# Patient Record
Sex: MALE | Race: WHITE | Employment: FULL TIME | URBAN - METROPOLITAN AREA
[De-identification: names, ages, dates, MRNs, and addresses within clinical notes are randomized per-mention and may not be internally consistent; named-entity substitution may affect disease eponyms.]

---

## 2018-01-19 ENCOUNTER — HOSPITAL ENCOUNTER (EMERGENCY)
Age: 18
Discharge: HOME | End: 2018-01-19
Attending: EMERGENCY MEDICINE
Payer: COMMERCIAL

## 2018-01-19 VITALS
HEIGHT: 69 IN | RESPIRATION RATE: 20 BRPM | SYSTOLIC BLOOD PRESSURE: 172 MMHG | HEART RATE: 112 BPM | BODY MASS INDEX: 28.14 KG/M2 | TEMPERATURE: 100.6 F | WEIGHT: 190 LBS | DIASTOLIC BLOOD PRESSURE: 71 MMHG | OXYGEN SATURATION: 96 %

## 2018-01-19 DIAGNOSIS — B34.9 ACUTE VIRAL SYNDROME: Primary | ICD-10-CM

## 2018-01-19 DIAGNOSIS — R03.0 TRANSIENT ELEVATED BLOOD PRESSURE: ICD-10-CM

## 2018-01-19 LAB
ANION GAP SERPL CALCULATED.3IONS-SCNC: 14 MEQ/L (ref 7–13)
BUN BLDV-MCNC: 13 MG/DL (ref 6–20)
CALCIUM SERPL-MCNC: 10.1 MG/DL (ref 8.6–10.2)
CHLORIDE BLD-SCNC: 99 MEQ/L (ref 98–107)
CO2: 26 MEQ/L (ref 22–29)
CREAT SERPL-MCNC: 1.1 MG/DL (ref 0.7–1.2)
GFR AFRICAN AMERICAN: >60
GFR NON-AFRICAN AMERICAN: >60
GLUCOSE BLD-MCNC: 118 MG/DL (ref 74–109)
HCT VFR BLD CALC: 46.4 % (ref 42–52)
HEMOGLOBIN: 16.1 G/DL (ref 14–18)
MCH RBC QN AUTO: 30.3 PG (ref 27–31.3)
MCHC RBC AUTO-ENTMCNC: 34.6 % (ref 33–37)
MCV RBC AUTO: 87.5 FL (ref 80–100)
PDW BLD-RTO: 13 % (ref 11.5–14.5)
PLATELET # BLD: 302 K/UL (ref 130–400)
POTASSIUM SERPL-SCNC: 3.7 MEQ/L (ref 3.5–5.1)
RAPID INFLUENZA  B AGN: NEGATIVE
RAPID INFLUENZA A AGN: NEGATIVE
RBC # BLD: 5.3 M/UL (ref 4.7–6.1)
SODIUM BLD-SCNC: 139 MEQ/L (ref 132–144)
WBC # BLD: 14.1 K/UL (ref 4.5–11)

## 2018-01-19 PROCEDURE — 6370000000 HC RX 637 (ALT 250 FOR IP): Performed by: EMERGENCY MEDICINE

## 2018-01-19 PROCEDURE — 80048 BASIC METABOLIC PNL TOTAL CA: CPT

## 2018-01-19 PROCEDURE — 85027 COMPLETE CBC AUTOMATED: CPT

## 2018-01-19 PROCEDURE — 86403 PARTICLE AGGLUT ANTBDY SCRN: CPT

## 2018-01-19 PROCEDURE — 2580000003 HC RX 258: Performed by: EMERGENCY MEDICINE

## 2018-01-19 PROCEDURE — 99283 EMERGENCY DEPT VISIT LOW MDM: CPT

## 2018-01-19 PROCEDURE — 96360 HYDRATION IV INFUSION INIT: CPT

## 2018-01-19 RX ORDER — IBUPROFEN 600 MG/1
600 TABLET ORAL ONCE
Status: COMPLETED | OUTPATIENT
Start: 2018-01-19 | End: 2018-01-19

## 2018-01-19 RX ORDER — 0.9 % SODIUM CHLORIDE 0.9 %
1000 INTRAVENOUS SOLUTION INTRAVENOUS ONCE
Status: COMPLETED | OUTPATIENT
Start: 2018-01-19 | End: 2018-01-19

## 2018-01-19 RX ORDER — ACETAMINOPHEN 500 MG
1000 TABLET ORAL ONCE
Status: COMPLETED | OUTPATIENT
Start: 2018-01-19 | End: 2018-01-19

## 2018-01-19 RX ADMIN — IBUPROFEN 600 MG: 600 TABLET, FILM COATED ORAL at 01:03

## 2018-01-19 RX ADMIN — SODIUM CHLORIDE 1000 ML: 9 INJECTION, SOLUTION INTRAVENOUS at 01:03

## 2018-01-19 RX ADMIN — ACETAMINOPHEN 1000 MG: 500 TABLET ORAL at 01:03

## 2018-01-19 ASSESSMENT — ENCOUNTER SYMPTOMS
NAUSEA: 0
VOMITING: 0
SHORTNESS OF BREATH: 0
SORE THROAT: 1
COUGH: 1

## 2018-01-19 ASSESSMENT — PAIN DESCRIPTION - DESCRIPTORS: DESCRIPTORS: ACHING

## 2018-01-19 ASSESSMENT — PAIN SCALES - GENERAL
PAINLEVEL_OUTOF10: 3
PAINLEVEL_OUTOF10: 3

## 2018-01-19 ASSESSMENT — PAIN DESCRIPTION - LOCATION: LOCATION: THROAT

## 2018-01-19 ASSESSMENT — PAIN DESCRIPTION - FREQUENCY: FREQUENCY: INTERMITTENT

## 2019-11-01 ENCOUNTER — HOSPITAL ENCOUNTER (EMERGENCY)
Age: 19
Discharge: HOME | End: 2019-11-01
Attending: EMERGENCY MEDICINE
Payer: MEDICAID

## 2019-11-01 ENCOUNTER — APPOINTMENT (OUTPATIENT)
Dept: GENERAL RADIOLOGY | Age: 19
End: 2019-11-01
Payer: MEDICAID

## 2019-11-01 VITALS
HEART RATE: 85 BPM | WEIGHT: 190 LBS | BODY MASS INDEX: 26.6 KG/M2 | OXYGEN SATURATION: 97 % | DIASTOLIC BLOOD PRESSURE: 57 MMHG | HEIGHT: 71 IN | SYSTOLIC BLOOD PRESSURE: 130 MMHG | RESPIRATION RATE: 18 BRPM | TEMPERATURE: 98.4 F

## 2019-11-01 DIAGNOSIS — I10 ESSENTIAL HYPERTENSION: ICD-10-CM

## 2019-11-01 DIAGNOSIS — F41.1 ANXIETY STATE: Primary | ICD-10-CM

## 2019-11-01 DIAGNOSIS — R11.0 NAUSEA: ICD-10-CM

## 2019-11-01 LAB
EKG ATRIAL RATE: 70 BPM
EKG P AXIS: 55 DEGREES
EKG P-R INTERVAL: 148 MS
EKG Q-T INTERVAL: 352 MS
EKG QRS DURATION: 86 MS
EKG QTC CALCULATION (BAZETT): 380 MS
EKG R AXIS: 59 DEGREES
EKG T AXIS: 59 DEGREES
EKG VENTRICULAR RATE: 70 BPM

## 2019-11-01 PROCEDURE — 6370000000 HC RX 637 (ALT 250 FOR IP): Performed by: EMERGENCY MEDICINE

## 2019-11-01 PROCEDURE — 99283 EMERGENCY DEPT VISIT LOW MDM: CPT

## 2019-11-01 PROCEDURE — 71046 X-RAY EXAM CHEST 2 VIEWS: CPT

## 2019-11-01 PROCEDURE — 93005 ELECTROCARDIOGRAM TRACING: CPT

## 2019-11-01 PROCEDURE — 93010 ELECTROCARDIOGRAM REPORT: CPT | Performed by: INTERNAL MEDICINE

## 2019-11-01 RX ORDER — ONDANSETRON 4 MG/1
4 TABLET, ORALLY DISINTEGRATING ORAL ONCE
Status: COMPLETED | OUTPATIENT
Start: 2019-11-01 | End: 2019-11-01

## 2019-11-01 RX ORDER — LISINOPRIL 10 MG/1
20 TABLET ORAL DAILY
COMMUNITY

## 2019-11-01 RX ADMIN — ONDANSETRON 4 MG: 4 TABLET, ORALLY DISINTEGRATING ORAL at 13:43

## 2019-11-01 ASSESSMENT — ENCOUNTER SYMPTOMS
ABDOMINAL PAIN: 0
CHOKING: 0
BACK PAIN: 0
TROUBLE SWALLOWING: 0
SORE THROAT: 0
EYE PAIN: 0
DIARRHEA: 0
EYE REDNESS: 0
VOICE CHANGE: 0
FACIAL SWELLING: 0
SHORTNESS OF BREATH: 0
STRIDOR: 0
CHEST TIGHTNESS: 0
BLOOD IN STOOL: 0
EYE DISCHARGE: 0
VOMITING: 0
CONSTIPATION: 0
SINUS PRESSURE: 0
NAUSEA: 1
WHEEZING: 0
COUGH: 0

## 2019-11-15 ENCOUNTER — APPOINTMENT (OUTPATIENT)
Dept: GENERAL RADIOLOGY | Age: 19
End: 2019-11-15
Payer: MEDICAID

## 2019-11-15 ENCOUNTER — HOSPITAL ENCOUNTER (EMERGENCY)
Age: 19
Discharge: HOME | End: 2019-11-15
Attending: EMERGENCY MEDICINE
Payer: MEDICAID

## 2019-11-15 VITALS
TEMPERATURE: 98.7 F | DIASTOLIC BLOOD PRESSURE: 86 MMHG | HEART RATE: 91 BPM | HEIGHT: 72 IN | BODY MASS INDEX: 26.14 KG/M2 | SYSTOLIC BLOOD PRESSURE: 151 MMHG | RESPIRATION RATE: 20 BRPM | OXYGEN SATURATION: 99 % | WEIGHT: 193 LBS

## 2019-11-15 DIAGNOSIS — I49.3 PVC'S (PREMATURE VENTRICULAR CONTRACTIONS): Primary | ICD-10-CM

## 2019-11-15 LAB
EKG ATRIAL RATE: 83 BPM
EKG P AXIS: 53 DEGREES
EKG P-R INTERVAL: 142 MS
EKG Q-T INTERVAL: 348 MS
EKG QRS DURATION: 90 MS
EKG QTC CALCULATION (BAZETT): 408 MS
EKG R AXIS: 54 DEGREES
EKG T AXIS: 65 DEGREES
EKG VENTRICULAR RATE: 83 BPM

## 2019-11-15 PROCEDURE — 93010 ELECTROCARDIOGRAM REPORT: CPT | Performed by: INTERNAL MEDICINE

## 2019-11-15 PROCEDURE — 99285 EMERGENCY DEPT VISIT HI MDM: CPT

## 2019-11-15 PROCEDURE — 71045 X-RAY EXAM CHEST 1 VIEW: CPT

## 2019-11-15 PROCEDURE — 93005 ELECTROCARDIOGRAM TRACING: CPT

## 2019-11-15 RX ORDER — PANTOPRAZOLE SODIUM 20 MG/1
20 TABLET, DELAYED RELEASE ORAL DAILY
COMMUNITY

## 2019-11-15 ASSESSMENT — PAIN DESCRIPTION - PAIN TYPE: TYPE: ACUTE PAIN

## 2019-11-15 ASSESSMENT — PAIN DESCRIPTION - LOCATION: LOCATION: CHEST

## 2019-11-15 ASSESSMENT — PAIN DESCRIPTION - DESCRIPTORS: DESCRIPTORS: ACHING

## 2019-11-15 ASSESSMENT — PAIN SCALES - GENERAL: PAINLEVEL_OUTOF10: 4

## 2019-11-15 ASSESSMENT — PAIN DESCRIPTION - ORIENTATION: ORIENTATION: MID

## 2019-11-15 ASSESSMENT — PAIN DESCRIPTION - FREQUENCY: FREQUENCY: INTERMITTENT

## 2019-11-15 ASSESSMENT — PAIN DESCRIPTION - ONSET: ONSET: ON-GOING

## 2020-01-01 ENCOUNTER — HOSPITAL ENCOUNTER (EMERGENCY)
Age: 20
Discharge: HOME | End: 2020-01-01
Attending: EMERGENCY MEDICINE
Payer: MEDICAID

## 2020-01-01 VITALS
SYSTOLIC BLOOD PRESSURE: 150 MMHG | TEMPERATURE: 98.8 F | HEART RATE: 100 BPM | OXYGEN SATURATION: 96 % | RESPIRATION RATE: 20 BRPM | HEIGHT: 71 IN | BODY MASS INDEX: 28 KG/M2 | DIASTOLIC BLOOD PRESSURE: 68 MMHG | WEIGHT: 200 LBS

## 2020-01-01 LAB
INFLUENZA A BY PCR: NEGATIVE
INFLUENZA B BY PCR: POSITIVE

## 2020-01-01 PROCEDURE — 99283 EMERGENCY DEPT VISIT LOW MDM: CPT

## 2020-01-01 PROCEDURE — 87502 INFLUENZA DNA AMP PROBE: CPT

## 2020-01-01 RX ORDER — OSELTAMIVIR PHOSPHATE 75 MG/1
75 CAPSULE ORAL 2 TIMES DAILY
Qty: 10 CAPSULE | Refills: 0 | Status: SHIPPED | OUTPATIENT
Start: 2020-01-01 | End: 2020-01-06

## 2020-01-01 RX ORDER — ONDANSETRON 4 MG/1
4 TABLET, ORALLY DISINTEGRATING ORAL EVERY 8 HOURS PRN
Qty: 10 TABLET | Refills: 0 | Status: SHIPPED | OUTPATIENT
Start: 2020-01-01

## 2020-01-01 ASSESSMENT — ENCOUNTER SYMPTOMS
ABDOMINAL PAIN: 0
WHEEZING: 0
TROUBLE SWALLOWING: 0
COUGH: 0
EYE PAIN: 0
EYE DISCHARGE: 0
SORE THROAT: 0
BLOOD IN STOOL: 0
VOMITING: 1
VOICE CHANGE: 0
STRIDOR: 0
SINUS PRESSURE: 0
CHEST TIGHTNESS: 0
CONSTIPATION: 0
NAUSEA: 1
CHOKING: 0
EYE REDNESS: 0
DIARRHEA: 1
RHINORRHEA: 1
FACIAL SWELLING: 0
SHORTNESS OF BREATH: 0
BACK PAIN: 0

## 2020-01-01 NOTE — ED NOTES
Nursing Adult Assessment    General Appearance  [x] Facial Expressions, extremities, & body posture are relaxed. [] Exceptions:    Cognitive  [x] Alert, make eye contact when prompted. [x] Oriented to person, place, & situation. [] Exceptions:    Respiratory  [x] Unlabored breathing   [x] Speaks in clear and complete sentences   [x] Chest expansion is symmetrical with breaths   [] Breath sounds are clear bilaterally. [x] Exceptions: cough, diminished    Cardiovascular  [x] Regular apical heart sounds   [x] Peripheral pulses are palpable   [x] Capillary refill < 3 seconds in all extremities. [] Exceptions:    Abdomen  [x] Non-tender   [x] Non-distended   [x] Bowel sounds are present. [] Exceptions:    Skin  [x] Color appropriate for ethnicity   [x] No rash or discoloration present at the area(s) of complaint   [x] Warm and dry to touch. [] Exceptions:    Extremities  [x] Non-tender   [x] Normal range of motion   [x] Normal sensation   [x] Normal Appearance, No Edema.   [x] Exceptions:      Long Rao RN  01/01/20 1215

## 2020-01-01 NOTE — ED NOTES
Rapid flu collected. Patient given oral fluids. Refused medication per Dr. Esther Bello report.      Kristy Torres RN  01/01/20 Madi Alvarado [address] [place] [address]  01/01/20 1912

## 2020-01-01 NOTE — ED PROVIDER NOTES
rash.   Neurological: Negative for tremors, seizures, syncope, weakness, numbness and headaches. Hematological: Negative for adenopathy. Does not bruise/bleed easily. Psychiatric/Behavioral: Negative for agitation, behavioral problems, hallucinations and sleep disturbance. The patient is not hyperactive. All other systems reviewed and are negative. Except as noted above the remainder of the review of systems was reviewed and negative. PAST MEDICAL HISTORY     Past Medical History:   Diagnosis Date    Asperger's syndrome 2004    Hypertension          SURGICALHISTORY       Past Surgical History:   Procedure Laterality Date    BLADDER SURGERY           CURRENT MEDICATIONS       Previous Medications    LISINOPRIL (PRINIVIL;ZESTRIL) 10 MG TABLET    Take 20 mg by mouth daily     PANTOPRAZOLE (PROTONIX) 20 MG TABLET    Take 20 mg by mouth daily       ALLERGIES     Patient has no known allergies. FAMILY HISTORY     History reviewed. No pertinent family history.        SOCIAL HISTORY       Social History     Socioeconomic History    Marital status: Single     Spouse name: None    Number of children: None    Years of education: None    Highest education level: None   Occupational History    None   Social Needs    Financial resource strain: None    Food insecurity:     Worry: None     Inability: None    Transportation needs:     Medical: None     Non-medical: None   Tobacco Use    Smoking status: Never Smoker    Smokeless tobacco: Never Used   Substance and Sexual Activity    Alcohol use: No    Drug use: No    Sexual activity: Never   Lifestyle    Physical activity:     Days per week: None     Minutes per session: None    Stress: None   Relationships    Social connections:     Talks on phone: None     Gets together: None     Attends Mu-ism service: None     Active member of club or organization: None     Attends meetings of clubs or organizations: None     Relationship status: None    REFERRED TO:  Elnora Mcardle, MD  [address] [facility] [address]  [phone]    In 1 week        DISCHARGE MEDICATIONS:  New Prescriptions    ONDANSETRON (ZOFRAN ODT) 4 MG DISINTEGRATING TABLET    Take 1 tablet by mouth every 8 hours as needed for Nausea    OSELTAMIVIR (TAMIFLU) 75 MG CAPSULE    Take 1 capsule by mouth 2 times daily for 5 days          (Please note that portions of this note were completed with a voice recognition program.  Efforts were made to edit the dictations but occasionally words are mis-transcribed.)    Jaime Kumar MD (electronically signed)  Attending Emergency Physician       Jaime Kumar MD  01/01/20 [phone]

## 2020-01-01 NOTE — ED NOTES
Dr. Madrid Ates in to discuss diagnosis with patient at this time.      Trevor Winter, RN  01/01/20 [id number]

## 2021-05-19 ENCOUNTER — HOSPITAL ENCOUNTER (EMERGENCY)
Age: 21
Discharge: HOME | End: 2021-05-19
Attending: EMERGENCY MEDICINE
Payer: MEDICAID

## 2021-05-19 VITALS
RESPIRATION RATE: 18 BRPM | TEMPERATURE: 98.5 F | OXYGEN SATURATION: 100 % | DIASTOLIC BLOOD PRESSURE: 69 MMHG | HEART RATE: 80 BPM | WEIGHT: 200 LBS | BODY MASS INDEX: 28 KG/M2 | SYSTOLIC BLOOD PRESSURE: 116 MMHG | HEIGHT: 71 IN

## 2021-05-19 DIAGNOSIS — F32.89 OTHER DEPRESSION: ICD-10-CM

## 2021-05-19 DIAGNOSIS — F84.5 ASPERGER'S SYNDROME: ICD-10-CM

## 2021-05-19 DIAGNOSIS — J06.9 VIRAL URI WITH COUGH: Primary | ICD-10-CM

## 2021-05-19 LAB
INFLUENZA A BY PCR: NEGATIVE
INFLUENZA B BY PCR: NEGATIVE

## 2021-05-19 PROCEDURE — 99284 EMERGENCY DEPT VISIT MOD MDM: CPT

## 2021-05-19 PROCEDURE — 87502 INFLUENZA DNA AMP PROBE: CPT

## 2021-05-19 ASSESSMENT — ENCOUNTER SYMPTOMS
SHORTNESS OF BREATH: 0
EYE REDNESS: 0
ABDOMINAL PAIN: 0
VOMITING: 0
SORE THROAT: 0
NAUSEA: 0
SINUS PAIN: 1
BACK PAIN: 0
COUGH: 1
EYE DISCHARGE: 0

## 2021-05-20 ENCOUNTER — CARE COORDINATION (OUTPATIENT)
Dept: CARE COORDINATION | Age: 21
End: 2021-05-20

## 2021-05-20 NOTE — CARE COORDINATION
Patient contacted regarding COVID-19 risk, exposure, diagnosis, pulse oximeter ordered at discharge and monoclonal antibody infusion follow up. Discussed COVID-19 related testing which was not done at this time. Test results were not done. Patient informed of results, if available? Yes      Ambulatory Care Manager contacted the patient by telephone to perform post discharge assessment. Call within 2 business days of discharge: Yes. Verified name and  with patient as identifiers. Provided introduction to self, and explanation of the CTN/ACM role, and reason for call due to risk factors for infection and/or exposure to COVID-19. Symptoms reviewed with patient who verbalized the following symptoms: fatigue, cough, no new symptoms, no worsening symptoms and congestion, sore throat, body aches . Due to no new or worsening symptoms encounter patient advised to go to any [facility] or other walk in clinic for covid testing . patient was not tested at ed. patient refused at the time. he didnt think it was necessary  routed to provider for escalation. Discussed follow-up appointments. If no appointment was previously scheduled, appointment scheduling offered: [facility][1] follow up appointment(s): No future appointments. Non-[facility] follow up appointment(s): patient to call his pcp     Non-face-to-face services provided:  Obtained and reviewed discharge summary and/or continuity of care documents  Education of patient/family/caregiver/guardian to support self-management-covid edu      Advance Care Planning:   Does patient have an Advance Directive:  reviewed and current. Educated patient about risk for severe COVID-19 due to risk factors according to CDC guidelines. ACM reviewed discharge instructions, medical action plan and red flag symptoms patient who verbalized understanding. Discussed COVID vaccination status Yes. Education provided on COVID-19 vaccination as appropriate. Discussed exposure protocols and quarantine with CDC Guidelines. Patient was given an opportunity to verbalize any questions and concerns and agrees to contact ACM or health care provider for questions related to their healthcare. Reviewed and educated patient on any new and changed medications related to discharge diagnosis     Was patient discharged with a pulse oximeter? No Discussed and confirmed pulse oximeter discharge instructions and when to notify provider or seek emergency care. For patients discharged due to monoclonal antibody infusion or pulse ox at discharge; information provided for remote patient monitoring, and agrees to enroll for follow up No.  Patient's preferred e-mail:    Patient's preferred phone number:   Based on remote monitoring alert triggers, patient will be contacted by nurse care manager for worsening symptoms. ACM provided contact information. Plan for follow-up call in 3-5 days based on severity of symptoms and risk factors. ACM SPOKE TO PATIENT. REVIEWED ED VISIT AND AVS  PATIENT DECLINED COVID TESTING AT ED. PER PATIENT HE DIDN'T THINK HE HAD S/S. ACM REVIEWED HIS S/S AND DISCUSSED COVID EDU/RISKS AND S/S  PATIENT TODAY WITH CONTINUED S/S SEE ABOVE  ACM ENCOURAGED PATIENT TO CALL HIS PCP OR GO TO ANY WALK IN CLINIC OR URGENT CARE FOR TESTING. PATIENT VERBALIZED HE WILL TODAY. Vaccine CDC recommendations   Vaccination should be offered to persons regardless of history of prior symptomatic or asymptomatic SARS-CoV-2 infection. Vaccination of persons with known current SARS-CoV-2 infection should be deferred until the person has recovered from the acute illness (if the person had symptoms) and criteria have been met for them to discontinue isolation.

## 2021-05-20 NOTE — ED PROVIDER NOTES
[address] [place] [address] ED  EMERGENCY DEPARTMENT ENCOUNTER      Pt Name: Cuco Wihte  MRN: [id number]  [place] 2000  Date of evaluation: 5/19/2021  Provider: Carl Samaniego DO        HISTORY OF PRESENT ILLNESS    Cuco White is a 23 y.o. male per chart review has ah/o aspergers and HTN. Presents with congestion and cough. He was worried about COVID-19 and urgent care was closed. The history is provided by the patient. URI  Presenting symptoms: congestion and cough    Presenting symptoms: no ear pain, no fever and no sore throat    Severity:  Mild  Onset quality:  Sudden  Timing:  Constant  Progression:  Worsening  Chronicity:  New  Relieved by:  Nothing  Worsened by:  Nothing  Ineffective treatments:  None tried  Associated symptoms: myalgias, sinus pain and sneezing    Associated symptoms: no neck pain    Risk factors: not elderly, no chronic cardiac disease, no chronic kidney disease, no chronic respiratory disease, no diabetes mellitus, no immunosuppression, no recent illness, no recent travel and no sick contacts             REVIEW OF SYSTEMS       Review of Systems   Constitutional: Negative for chills and fever. HENT: Positive for congestion, sinus pain and sneezing. Negative for ear pain and sore throat. Eyes: Negative for discharge and redness. Respiratory: Positive for cough. Negative for shortness of breath. Cardiovascular: Negative for chest pain and palpitations. Gastrointestinal: Negative for abdominal pain, nausea and vomiting. Genitourinary: Negative for difficulty urinating and dysuria. Musculoskeletal: Positive for myalgias. Negative for back pain and neck pain. Skin: Negative for rash and wound. Neurological: Negative for dizziness and syncope. Psychiatric/Behavioral: Negative for confusion. The patient is not nervous/anxious. All other systems reviewed and are negative.       Except as noted above the remainder of the review of systems was reviewed and negative. PAST MEDICAL HISTORY     Past Medical History:   Diagnosis Date    Asperger's syndrome 2004    Hypertension          SURGICAL HISTORY       Past Surgical History:   Procedure Laterality Date    BLADDER SURGERY           CURRENT MEDICATIONS       Discharge Medication List as of 5/19/2021 10:18 PM      CONTINUE these medications which have NOT CHANGED    Details   ondansetron (ZOFRAN ODT) 4 MG disintegrating tablet Take 1 tablet by mouth every 8 hours as needed for Nausea, Disp-10 tablet, R-0Print      pantoprazole (PROTONIX) 20 MG tablet Take 20 mg by mouth dailyHistorical Med      lisinopril (PRINIVIL;ZESTRIL) 10 MG tablet Take 20 mg by mouth daily Historical Med             ALLERGIES     Patient has no known allergies. FAMILY HISTORY     History reviewed. No pertinent family history. SOCIAL HISTORY       Social History     Socioeconomic History    Marital status: Single     Spouse name: None    Number of children: None    Years of education: None    Highest education level: None   Occupational History    None   Tobacco Use    Smoking status: Never Smoker    Smokeless tobacco: Never Used   Vaping Use    Vaping Use: Never used   Substance and Sexual Activity    Alcohol use: No    Drug use: No    Sexual activity: Never   Other Topics Concern    None   Social History Narrative    None     Social Determinants of Health     Financial Resource Strain:     Difficulty of Paying Living Expenses:    Food Insecurity:     Worried About Running Out of Food in the Last Year:     Ran Out of Food in the Last Year:    Transportation Needs:     Lack of Transportation (Medical):      Lack of Transportation (Non-Medical):    Physical Activity:     Days of Exercise per Week:     Minutes of Exercise per Session:    Stress:     Feeling of Stress :    Social Connections:     Frequency of Communication with Friends and Family:     Frequency of Social Gatherings with Friends and Family:     Attends Yazidism Services:     Active Member of Clubs or Organizations:     Attends Club or Organization Meetings:     Marital Status:    Intimate Partner Violence:     Fear of Current or Ex-Partner:     Emotionally Abused:     Physically Abused:     Sexually Abused:          PHYSICAL EXAM       ED Triage Vitals [05/19/21 1951]   BP Temp Temp Source Pulse Resp SpO2 Height Weight   136/71 98.5 °F (36.9 °C) Oral 80 18 100 % 5' 11\" (1.803 m) 200 lb (90.7 kg)       Physical Exam  Vitals and nursing note reviewed. Constitutional:       Appearance: Normal appearance. HENT:      Head: Normocephalic and atraumatic. Right Ear: Tympanic membrane normal.      Left Ear: Tympanic membrane normal.      Nose: Nose normal.      Mouth/Throat:      Mouth: Mucous membranes are moist.      Pharynx: Oropharynx is clear. Eyes:      General: Lids are normal.      Extraocular Movements: Extraocular movements intact. Conjunctiva/sclera: Conjunctivae normal.      Pupils: Pupils are equal, round, and reactive to light. Cardiovascular:      Rate and Rhythm: Normal rate and regular rhythm. Pulses: Normal pulses. Heart sounds: Normal heart sounds. Pulmonary:      Effort: Pulmonary effort is normal.      Breath sounds: Normal breath sounds. Abdominal:      General: Abdomen is flat. Bowel sounds are normal.      Palpations: Abdomen is soft. Musculoskeletal:         General: Normal range of motion. Cervical back: Full passive range of motion without pain, normal range of motion and neck supple. Skin:     General: Skin is warm. Capillary Refill: Capillary refill takes less than 2 seconds. Neurological:      General: No focal deficit present. Mental Status: He is alert and oriented to person, place, and time. Deep Tendon Reflexes: Reflexes are normal and symmetric.    Psychiatric:         Attention and Perception: Attention and perception normal.         Mood and Affect: Mood normal. Behavior: Behavior normal. Behavior is cooperative. LABS:  Labs Reviewed   RAPID INFLUENZA A/B ANTIGENS   COVID-19, RAPID         MDM:   Vitals:    Vitals:    05/19/21 1951 05/19/21 2100 05/19/21 2130   BP: 136/71 121/79 116/69   Pulse: 80     Resp: 18     Temp: 98.5 °F (36.9 °C)     TempSrc: Oral     SpO2: 100%     Weight: 200 lb (90.7 kg)     Height: 5' 11\" (1.803 m)         MDM  Number of Diagnoses or Management Options  Viral URI with cough  Diagnosis management comments: Patient presents with cough and congestion. Rapid flu ordered. The flu was negative for A and B. CXR: negative for acute changes. He will be discharged home. He will follow up in 2 days with is primary care doctor. Constantin Mancini DO     The lab results, radiology and test results were reviewed with the patient and family. The patient will follow up in 2 days with their primary care doctor. If their symptoms change or get worse they will return to the ER. CRITICAL CARE TIME   Total CriticalCare time was 0 minutes, excluding separately reportable procedures. There was a high probability of clinically significant/life threatening deterioration in the patient's condition which required my urgent intervention. PROCEDURES:  Unlessotherwise noted below, none     Procedures      FINAL IMPRESSION      1. Viral URI with cough    2. Asperger's syndrome    3.  Other depression          DISPOSITION/PLAN   DISPOSITION            Constantin Mancini DO (electronically signed)  Attending Emergency Physician          Houston Gardiner DO  05/22/21 [id number]

## 2021-05-24 ENCOUNTER — CARE COORDINATION (OUTPATIENT)
Dept: CARE COORDINATION | Age: 21
End: 2021-05-24

## 2021-05-24 NOTE — CARE COORDINATION
You Patient resolved from the Care Transitions episode on 5/24/2021  Discussed COVID-19 related testing which was not done at this time. Test results were not done. Patient informed of results, if available? NA     Patient/family has been provided the following resources and education related to COVID-19:                         Signs, symptoms and red flags related to COVID-19            CDC exposure and quarantine guidelines            [facility] exposure contact - [phone]            Contact for their local Department of Health                 Patient currently reports that the following symptoms have improved:  no new/worsening symptoms     No further outreach scheduled with this CTN/ACM. Episode of Care resolved. Patient has this CTN/ACM contact information if future needs arise. ACM SPOKE TO PATIENT HE REPORTS WENT TO [facility] FOR TESTING AND REPORTS NEGATIVE COVID . PATIENT REPORTS FEELING BETTER AND ALL S/S RESOLVED.  PATIENT ENCOURAGED TO FU WITH HIS PCP

## 2023-12-07 ENCOUNTER — OFFICE VISIT (OUTPATIENT)
Dept: PRIMARY CARE | Facility: CLINIC | Age: 23
End: 2023-12-07
Payer: COMMERCIAL

## 2023-12-07 ENCOUNTER — TELEPHONE (OUTPATIENT)
Dept: PRIMARY CARE | Facility: CLINIC | Age: 23
End: 2023-12-07

## 2023-12-07 ENCOUNTER — HOSPITAL ENCOUNTER (OUTPATIENT)
Dept: RADIOLOGY | Facility: HOSPITAL | Age: 23
Discharge: HOME | End: 2023-12-07
Payer: COMMERCIAL

## 2023-12-07 VITALS
SYSTOLIC BLOOD PRESSURE: 128 MMHG | OXYGEN SATURATION: 98 % | BODY MASS INDEX: 34.14 KG/M2 | DIASTOLIC BLOOD PRESSURE: 76 MMHG | TEMPERATURE: 97.9 F | HEART RATE: 80 BPM | RESPIRATION RATE: 16 BRPM | WEIGHT: 218 LBS

## 2023-12-07 DIAGNOSIS — R06.02 SOB (SHORTNESS OF BREATH): ICD-10-CM

## 2023-12-07 DIAGNOSIS — R06.02 SOB (SHORTNESS OF BREATH): Primary | ICD-10-CM

## 2023-12-07 DIAGNOSIS — R07.9 CHEST PAIN, UNSPECIFIED TYPE: ICD-10-CM

## 2023-12-07 PROBLEM — F84.5 ASPERGER SYNDROME (HHS-HCC): Status: ACTIVE | Noted: 2023-12-07

## 2023-12-07 PROBLEM — K21.9 GASTROESOPHAGEAL REFLUX DISEASE WITHOUT ESOPHAGITIS: Status: ACTIVE | Noted: 2023-12-07

## 2023-12-07 PROBLEM — I10 HYPERTENSION: Status: ACTIVE | Noted: 2023-12-07

## 2023-12-07 PROBLEM — R10.13 DYSPEPSIA: Status: ACTIVE | Noted: 2023-12-07

## 2023-12-07 LAB — POC SARS-COV-2 AG BINAX: NORMAL

## 2023-12-07 PROCEDURE — 87635 SARS-COV-2 COVID-19 AMP PRB: CPT

## 2023-12-07 PROCEDURE — 3078F DIAST BP <80 MM HG: CPT | Performed by: NURSE PRACTITIONER

## 2023-12-07 PROCEDURE — 2550000001 HC RX 255 CONTRASTS: Performed by: NURSE PRACTITIONER

## 2023-12-07 PROCEDURE — 87811 SARS-COV-2 COVID19 W/OPTIC: CPT | Performed by: NURSE PRACTITIONER

## 2023-12-07 PROCEDURE — 3074F SYST BP LT 130 MM HG: CPT | Performed by: NURSE PRACTITIONER

## 2023-12-07 PROCEDURE — 1036F TOBACCO NON-USER: CPT | Performed by: NURSE PRACTITIONER

## 2023-12-07 PROCEDURE — 71275 CT ANGIOGRAPHY CHEST: CPT

## 2023-12-07 PROCEDURE — 71275 CT ANGIOGRAPHY CHEST: CPT | Performed by: RADIOLOGY

## 2023-12-07 PROCEDURE — 99214 OFFICE O/P EST MOD 30 MIN: CPT | Performed by: NURSE PRACTITIONER

## 2023-12-07 RX ADMIN — IOHEXOL 100 ML: 350 INJECTION, SOLUTION INTRAVENOUS at 15:30

## 2023-12-07 ASSESSMENT — ENCOUNTER SYMPTOMS
VOMITING: 0
COUGH: 0
FEVER: 0
SINUS PAIN: 0
SINUS PRESSURE: 0
CHILLS: 1
WHEEZING: 0
MYALGIAS: 0
NAUSEA: 0
CHEST TIGHTNESS: 0
SHORTNESS OF BREATH: 1
APPETITE CHANGE: 1
RHINORRHEA: 0
FATIGUE: 1

## 2023-12-07 NOTE — LETTER
December 7, 2023     Patient: Talon Lo   YOB: 2000   Date of Visit: 12/7/2023       To Whom It May Concern:    Talon Lo was seen in my clinic on 12/7/2023 at 12:00 pm. Please excuse Talon for his absence from work on this day to make the appointment. Please excuse from work 12/7/23 and 12/8/23    If you have any questions or concerns, please don't hesitate to call.         Sincerely,         [facility]        CC: No Recipients

## 2023-12-07 NOTE — PROGRESS NOTES
Symptoms started two days ago with shortness of breath, chills and head fog. Patient took a tylenol and it helped. Patient is not vaccinated against COVID.     Review of Systems   Constitutional:  Positive for appetite change, chills and fatigue. Negative for fever.   HENT:  Negative for congestion, postnasal drip, rhinorrhea, sinus pressure and sinus pain.    Respiratory:  Positive for shortness of breath. Negative for cough, chest tightness and wheezing.    Cardiovascular:  Positive for chest pain.        Patient could not explain what the pain in his chest felt like, almost like tightness.   Gastrointestinal:  Negative for nausea and vomiting.   Musculoskeletal:  Negative for myalgias.   Neurological:         Head fog     Objective   /76   Pulse 98   Temp 36.6 °C (97.9 °F)   Resp 16   Wt 98.9 kg (218 lb)   SpO2 98%   BMI 34.14 kg/m²     Physical Exam  Vitals reviewed.   Constitutional:       General: He is not in acute distress.     Appearance: Normal appearance. He is not ill-appearing or toxic-appearing.   HENT:      Head: Atraumatic.      Right Ear: Tympanic membrane, ear canal and external ear normal.      Left Ear: Tympanic membrane, ear canal and external ear normal.      Nose: Congestion present. No rhinorrhea.      Mouth/Throat:      Mouth: Mucous membranes are moist.      Pharynx: Oropharynx is clear. No oropharyngeal exudate or posterior oropharyngeal erythema.   Eyes:      Conjunctiva/sclera: Conjunctivae normal.   Cardiovascular:      Rate and Rhythm: Normal rate and regular rhythm.      Heart sounds: Normal heart sounds. No murmur heard.  Pulmonary:      Effort: Pulmonary effort is normal.      Breath sounds: Normal breath sounds. No wheezing or rhonchi.   Musculoskeletal:         General: Normal range of motion.   Skin:     General: Skin is warm and dry.   Neurological:      General: No focal deficit present.      Mental Status: He is alert.   Psychiatric:         Mood and Affect: Mood  normal.     Assessment/Plan   Problem List Items Addressed This Visit    None  Visit Diagnoses         Codes    SOB (shortness of breath)    -  Primary R06.02    Relevant Orders    POCT BinaxNOW Covid-19 Ag Card manually resulted (Completed)    CT chest w IV contrast    Comprehensive Metabolic Panel    Chest pain, unspecified type     R07.9    Relevant Orders    CT chest w IV contrast    Comprehensive Metabolic Panel          Patient with chest pain, shortness of breath. COVID testing negative in the office. Will get back up COVID testing. Patient's symptoms are concerning for PE. I ordered a STAT CT scan but it could not be done until tomorrow. Patient was not feeling well and wanted to be seen today for imaging to rule out PE, so patient prefers to go to the [place] at [facility] [place]. Patient to go to the ER. Called the ER and gave report to Emily

## 2023-12-08 ENCOUNTER — HOSPITAL ENCOUNTER (OUTPATIENT)
Dept: RADIOLOGY | Facility: HOSPITAL | Age: 23
End: 2023-12-08

## 2023-12-08 ENCOUNTER — TELEPHONE (OUTPATIENT)
Dept: PRIMARY CARE | Facility: CLINIC | Age: 23
End: 2023-12-08
Payer: COMMERCIAL

## 2023-12-08 LAB — SARS-COV-2 RNA RESP QL NAA+PROBE: NOT DETECTED

## 2023-12-08 NOTE — TELEPHONE ENCOUNTER
----- Message from ERINN Regalado-CNP sent at 12/8/2023  7:55 AM EST -----  Please let pt know that COVID is negative

## 2025-02-20 ENCOUNTER — APPOINTMENT (OUTPATIENT)
Dept: CARDIOLOGY | Facility: HOSPITAL | Age: 25
End: 2025-02-20
Payer: COMMERCIAL

## 2025-02-20 ENCOUNTER — HOSPITAL ENCOUNTER (EMERGENCY)
Facility: HOSPITAL | Age: 25
Discharge: HOME | End: 2025-02-20
Attending: STUDENT IN AN ORGANIZED HEALTH CARE EDUCATION/TRAINING PROGRAM
Payer: COMMERCIAL

## 2025-02-20 ENCOUNTER — APPOINTMENT (OUTPATIENT)
Dept: RADIOLOGY | Facility: HOSPITAL | Age: 25
End: 2025-02-20
Payer: COMMERCIAL

## 2025-02-20 VITALS
TEMPERATURE: 98.1 F | HEART RATE: 73 BPM | SYSTOLIC BLOOD PRESSURE: 160 MMHG | OXYGEN SATURATION: 98 % | RESPIRATION RATE: 18 BRPM | WEIGHT: 210 LBS | HEIGHT: 71 IN | BODY MASS INDEX: 29.4 KG/M2 | DIASTOLIC BLOOD PRESSURE: 84 MMHG

## 2025-02-20 DIAGNOSIS — R07.9 CHEST PAIN, UNSPECIFIED TYPE: ICD-10-CM

## 2025-02-20 DIAGNOSIS — J06.9 UPPER RESPIRATORY TRACT INFECTION, UNSPECIFIED TYPE: Primary | ICD-10-CM

## 2025-02-20 LAB
ALBUMIN SERPL BCP-MCNC: 5.1 G/DL (ref 3.4–5)
ALP SERPL-CCNC: 51 U/L (ref 33–120)
ALT SERPL W P-5'-P-CCNC: 29 U/L (ref 10–52)
ANION GAP SERPL CALC-SCNC: 12 MMOL/L (ref 10–20)
AST SERPL W P-5'-P-CCNC: 24 U/L (ref 9–39)
ATRIAL RATE: 82 BPM
BASOPHILS # BLD AUTO: 0.03 X10*3/UL (ref 0–0.1)
BASOPHILS NFR BLD AUTO: 0.3 %
BILIRUB SERPL-MCNC: 0.5 MG/DL (ref 0–1.2)
BUN SERPL-MCNC: 7 MG/DL (ref 6–23)
CALCIUM SERPL-MCNC: 9.5 MG/DL (ref 8.6–10.3)
CARDIAC TROPONIN I PNL SERPL HS: 5 NG/L (ref 0–20)
CHLORIDE SERPL-SCNC: 101 MMOL/L (ref 98–107)
CO2 SERPL-SCNC: 28 MMOL/L (ref 21–32)
CREAT SERPL-MCNC: 0.92 MG/DL (ref 0.5–1.3)
EGFRCR SERPLBLD CKD-EPI 2021: >90 ML/MIN/1.73M*2
EOSINOPHIL # BLD AUTO: 0.16 X10*3/UL (ref 0–0.7)
EOSINOPHIL NFR BLD AUTO: 1.8 %
ERYTHROCYTE [DISTWIDTH] IN BLOOD BY AUTOMATED COUNT: 12 % (ref 11.5–14.5)
FLUAV RNA RESP QL NAA+PROBE: NOT DETECTED
FLUBV RNA RESP QL NAA+PROBE: NOT DETECTED
GLUCOSE SERPL-MCNC: 94 MG/DL (ref 74–99)
HCT VFR BLD AUTO: 47.1 % (ref 41–52)
HGB BLD-MCNC: 16.5 G/DL (ref 13.5–17.5)
IMM GRANULOCYTES # BLD AUTO: 0.02 X10*3/UL (ref 0–0.7)
IMM GRANULOCYTES NFR BLD AUTO: 0.2 % (ref 0–0.9)
LYMPHOCYTES # BLD AUTO: 2.09 X10*3/UL (ref 1.2–4.8)
LYMPHOCYTES NFR BLD AUTO: 23.5 %
MCH RBC QN AUTO: 29.6 PG (ref 26–34)
MCHC RBC AUTO-ENTMCNC: 35 G/DL (ref 32–36)
MCV RBC AUTO: 84 FL (ref 80–100)
MONOCYTES # BLD AUTO: 0.58 X10*3/UL (ref 0.1–1)
MONOCYTES NFR BLD AUTO: 6.5 %
NEUTROPHILS # BLD AUTO: 6.02 X10*3/UL (ref 1.2–7.7)
NEUTROPHILS NFR BLD AUTO: 67.7 %
NRBC BLD-RTO: 0 /100 WBCS (ref 0–0)
P AXIS: 67 DEGREES
P OFFSET: 204 MS
P ONSET: 165 MS
PLATELET # BLD AUTO: 368 X10*3/UL (ref 150–450)
POTASSIUM SERPL-SCNC: 3.3 MMOL/L (ref 3.5–5.3)
PR INTERVAL: 114 MS
PROT SERPL-MCNC: 7.8 G/DL (ref 6.4–8.2)
Q ONSET: 222 MS
QRS COUNT: 14 BEATS
QRS DURATION: 90 MS
QT INTERVAL: 364 MS
QTC CALCULATION(BAZETT): 425 MS
QTC FREDERICIA: 404 MS
R AXIS: 62 DEGREES
RBC # BLD AUTO: 5.58 X10*6/UL (ref 4.5–5.9)
SARS-COV-2 RNA RESP QL NAA+PROBE: NOT DETECTED
SODIUM SERPL-SCNC: 138 MMOL/L (ref 136–145)
T AXIS: 52 DEGREES
T OFFSET: 404 MS
VENTRICULAR RATE: 82 BPM
WBC # BLD AUTO: 8.9 X10*3/UL (ref 4.4–11.3)

## 2025-02-20 PROCEDURE — 93005 ELECTROCARDIOGRAM TRACING: CPT

## 2025-02-20 PROCEDURE — 71045 X-RAY EXAM CHEST 1 VIEW: CPT

## 2025-02-20 PROCEDURE — 85025 COMPLETE CBC W/AUTO DIFF WBC: CPT | Performed by: STUDENT IN AN ORGANIZED HEALTH CARE EDUCATION/TRAINING PROGRAM

## 2025-02-20 PROCEDURE — 2500000004 HC RX 250 GENERAL PHARMACY W/ HCPCS (ALT 636 FOR OP/ED): Performed by: STUDENT IN AN ORGANIZED HEALTH CARE EDUCATION/TRAINING PROGRAM

## 2025-02-20 PROCEDURE — 99285 EMERGENCY DEPT VISIT HI MDM: CPT | Performed by: STUDENT IN AN ORGANIZED HEALTH CARE EDUCATION/TRAINING PROGRAM

## 2025-02-20 PROCEDURE — 36415 COLL VENOUS BLD VENIPUNCTURE: CPT | Performed by: STUDENT IN AN ORGANIZED HEALTH CARE EDUCATION/TRAINING PROGRAM

## 2025-02-20 PROCEDURE — 71045 X-RAY EXAM CHEST 1 VIEW: CPT | Performed by: RADIOLOGY

## 2025-02-20 PROCEDURE — 84484 ASSAY OF TROPONIN QUANT: CPT | Performed by: STUDENT IN AN ORGANIZED HEALTH CARE EDUCATION/TRAINING PROGRAM

## 2025-02-20 PROCEDURE — 80053 COMPREHEN METABOLIC PANEL: CPT | Performed by: STUDENT IN AN ORGANIZED HEALTH CARE EDUCATION/TRAINING PROGRAM

## 2025-02-20 PROCEDURE — 96374 THER/PROPH/DIAG INJ IV PUSH: CPT

## 2025-02-20 PROCEDURE — 87636 SARSCOV2 & INF A&B AMP PRB: CPT | Performed by: STUDENT IN AN ORGANIZED HEALTH CARE EDUCATION/TRAINING PROGRAM

## 2025-02-20 RX ORDER — KETOROLAC TROMETHAMINE 30 MG/ML
15 INJECTION, SOLUTION INTRAMUSCULAR; INTRAVENOUS ONCE
Status: COMPLETED | OUTPATIENT
Start: 2025-02-20 | End: 2025-02-20

## 2025-02-20 RX ADMIN — KETOROLAC TROMETHAMINE 15 MG: 30 INJECTION, SOLUTION INTRAMUSCULAR at 19:22

## 2025-02-20 ASSESSMENT — COLUMBIA-SUICIDE SEVERITY RATING SCALE - C-SSRS
2. HAVE YOU ACTUALLY HAD ANY THOUGHTS OF KILLING YOURSELF?: NO
1. IN THE PAST MONTH, HAVE YOU WISHED YOU WERE DEAD OR WISHED YOU COULD GO TO SLEEP AND NOT WAKE UP?: NO
6. HAVE YOU EVER DONE ANYTHING, STARTED TO DO ANYTHING, OR PREPARED TO DO ANYTHING TO END YOUR LIFE?: NO

## 2025-02-20 ASSESSMENT — PAIN DESCRIPTION - PROGRESSION
CLINICAL_PROGRESSION: NOT CHANGED
CLINICAL_PROGRESSION: GRADUALLY IMPROVING

## 2025-02-20 ASSESSMENT — PAIN - FUNCTIONAL ASSESSMENT
PAIN_FUNCTIONAL_ASSESSMENT: 0-10
PAIN_FUNCTIONAL_ASSESSMENT: 0-10

## 2025-02-20 ASSESSMENT — PAIN DESCRIPTION - FREQUENCY: FREQUENCY: CONSTANT/CONTINUOUS

## 2025-02-20 ASSESSMENT — HEART SCORE
TROPONIN: LESS THAN OR EQUAL TO NORMAL LIMIT
ECG: NORMAL
HISTORY: SLIGHTLY SUSPICIOUS
HEART SCORE: 1
AGE: <45
RISK FACTORS: 1-2 RISK FACTORS

## 2025-02-20 ASSESSMENT — PAIN DESCRIPTION - LOCATION
LOCATION: BACK
LOCATION: GENERALIZED

## 2025-02-20 ASSESSMENT — PAIN DESCRIPTION - PAIN TYPE
TYPE: ACUTE PAIN
TYPE: ACUTE PAIN

## 2025-02-20 ASSESSMENT — PAIN SCALES - GENERAL
PAINLEVEL_OUTOF10: 3
PAINLEVEL_OUTOF10: 6

## 2025-02-20 ASSESSMENT — PAIN DESCRIPTION - DESCRIPTORS: DESCRIPTORS: BURNING;SHARP

## 2025-02-20 ASSESSMENT — PAIN DESCRIPTION - ONSET: ONSET: GRADUAL

## 2025-02-20 NOTE — ED PROVIDER NOTES
HPI   Chief Complaint   Patient presents with    Flu Symptoms     Sob, cold feet/hands, back pain, chest pain, no runny nose/cough started hour ago         History provided by:  Patient  24-year-old male presented for evaluation of chills, body aches, chest pain, shortness of breath, headache.  Took acetaminophen this afternoon without relief in symptoms.  No fevers.  History of hypertension.  Has not been taking antihypertensive medication.        Patient History   Past Medical History:   Diagnosis Date    Cellulitis of left upper limb     Cellulitis of left upper extremity    Lymphangitis     Lymphangitis    Personal history of other specified conditions 11/14/2019    History of chest pain    Personal history of other specified conditions 12/06/2019    History of abdominal pain     Past Surgical History:   Procedure Laterality Date    OTHER SURGICAL HISTORY  11/07/2019    Bladder surgery     No family history on file.  Social History     Tobacco Use    Smoking status: Never    Smokeless tobacco: Never   Substance Use Topics    Alcohol use: Not on file    Drug use: Not on file       Physical Exam   ED Triage Vitals [02/20/25 1808]   Temperature Heart Rate Respirations BP   36.7 °C (98.1 °F) 96 19 (!) 207/109      Pulse Ox Temp Source Heart Rate Source Patient Position   99 % Temporal Monitor Sitting      BP Location FiO2 (%)     Left arm --       Physical Exam  Vitals and nursing note reviewed.   Constitutional:       General: He is not in acute distress.  HENT:      Head: Atraumatic.      Mouth/Throat:      Mouth: Mucous membranes are moist.      Pharynx: Oropharynx is clear.   Eyes:      Conjunctiva/sclera: Conjunctivae normal.   Cardiovascular:      Rate and Rhythm: Normal rate and regular rhythm.      Pulses: Normal pulses.   Pulmonary:      Effort: Pulmonary effort is normal. No respiratory distress.      Breath sounds: Normal breath sounds.   Abdominal:      General: There is no distension.   Musculoskeletal:          General: No deformity.      Cervical back: Neck supple.   Skin:     General: Skin is warm and dry.   Neurological:      Mental Status: He is alert and oriented to person, place, and time. Mental status is at baseline.   Psychiatric:         Mood and Affect: Mood normal.         Behavior: Behavior normal.           ED Course & MDM   Diagnoses as of 02/21/25 0053   Upper respiratory tract infection, unspecified type   Chest pain, unspecified type                 No data recorded       HEART Score: 1 (02/20/25 2053 : Darius Harris MD)                 Labs Reviewed   COMPREHENSIVE METABOLIC PANEL - Abnormal       Result Value    Glucose 94      Sodium 138      Potassium 3.3 (*)     Chloride 101      Bicarbonate 28      Anion Gap 12      Urea Nitrogen 7      Creatinine 0.92      eGFR >90      Calcium 9.5      Albumin 5.1 (*)     Alkaline Phosphatase 51      Total Protein 7.8      AST 24      Bilirubin, Total 0.5      ALT 29     INFLUENZA A AND B PCR - Normal    Flu A Result Not Detected      Flu B Result Not Detected      Narrative:     This assay is an in vitro diagnostic multiplex nucleic acid amplification test for the detection and discrimination of Influenza A & B from nasopharyngeal specimens, and has been validated for use at [facility]. Negative results do not preclude Influenza A/B infections, and should not be used as the sole basis for diagnosis, treatment, or other management decisions. If Influenza A/B and RSV PCR results are negative, testing for Parainfluenza virus, Adenovirus and Metapneumovirus is routinely performed for [facility] pediatric oncology and intensive care inpatients, and is available on other patients by placing an add-on request.   SARS-COV-2 PCR - Normal    Coronavirus 2019, PCR Not Detected      Narrative:     This assay is an FDA-cleared, in vitro diagnostic nucleic acid amplification test for the qualitative detection and differentiation of SARS CoV-2  from nasopharyngeal specimens collected from individuals with signs and symptoms of respiratory tract infections, and has been validated for use at [facility]. Negative results do not preclude COVID-19 infections and should not be used as the sole basis for diagnosis, treatment, or other management decisions. Testing for SARS CoV-2 is recommended only for patients who meet current clinical and/or epidemiological criteria defined by federal, state, or local public health directives.   TROPONIN I, HIGH SENSITIVITY - Normal    Troponin I, High Sensitivity 5      Narrative:     Less than 99th percentile of normal range cutoff-  Female and children under 18 years old <14 ng/L; Male <21 ng/L: Negative  Repeat testing should be performed if clinically indicated.     Female and children under 18 years old 14-50 ng/L; Male 21-50 ng/L:  Consistent with possible cardiac damage and possible increased clinical   risk. Serial measurements may help to assess extent of myocardial damage.     >50 ng/L: Consistent with cardiac damage, increased clinical risk and  myocardial infarction. Serial measurements may help assess extent of   myocardial damage.      NOTE: Children less than 1 year old may have higher baseline troponin   levels and results should be interpreted in conjunction with the overall   clinical context.     NOTE: Troponin I testing is performed using a different   testing methodology at [facility] than at [facility]. Direct result comparisons should only   be made within the same method.   CBC WITH AUTO DIFFERENTIAL    WBC 8.9      nRBC 0.0      RBC 5.58      Hemoglobin 16.5      Hematocrit 47.1      MCV 84      MCH 29.6      MCHC 35.0      RDW 12.0      Platelets 368      Neutrophils % 67.7      Immature Granulocytes %, Automated 0.2      Lymphocytes % 23.5      Monocytes % 6.5      Eosinophils % 1.8      Basophils % 0.3      Neutrophils Absolute 6.02      Immature  Granulocytes Absolute, Automated 0.02      Lymphocytes Absolute 2.09      Monocytes Absolute 0.58      Eosinophils Absolute 0.16      Basophils Absolute 0.03       XR chest 1 view   Final Result   No acute cardiopulmonary abnormality.        Signed by: Juanita Hameed 2/20/2025 6:57 PM   Dictation workstation:   [id number]                Medical Decision Making  Amount and/or Complexity of Data Reviewed  ECG/medicine tests: ordered and independent interpretation performed. Decision-making details documented in ED Course.     Details: Twelve-lead ECG obtained at 1803 by my interpretation demonstrates normal sinus rhythm with a rate of 82, no acute ST elevation or depression.  No other acute ischemic changes.  Normal intervals.      24-year-old male presenting with 1 day of flulike symptoms including chest pain.  History of hypertension, no history of CAD or MI.  Not currently taking antihypertensives.  Patient notably hypertensive on evaluation but otherwise hemodynamically stable.  Lungs are clear on exam.  Overall benign examination.  Labs obtained including viral swabs and troponin to rule out ACS.    Patient's chest x-ray negative for acute findings.  No infiltrate to suggest pneumonia.  Labs reviewed and are overall unremarkable.  CBC without leukocytosis, normal H&H.  Metabolic panel with normal renal function.  Mild hypokalemia with potassium 3.3.  High-sensitivity troponin normal, do not suspect ACS.  Viral swabs negative.    Patient's blood pressure mildly improved without treatment.  Discussed continued follow-up with primary care for hypertension.    Procedure  Procedures     Darius Harris MD  02/21/25 0053

## 2025-02-20 NOTE — Clinical Note
Talon Lo was seen and treated in our emergency department on 2/20/2025.  He may return to work on 02/24/2025.       If you have any questions or concerns, please don't hesitate to call.      Darius Harris MD

## 2025-02-28 LAB
ATRIAL RATE: 82 BPM
P AXIS: 67 DEGREES
P OFFSET: 204 MS
P ONSET: 165 MS
PR INTERVAL: 114 MS
Q ONSET: 222 MS
QRS COUNT: 14 BEATS
QRS DURATION: 90 MS
QT INTERVAL: 364 MS
QTC CALCULATION(BAZETT): 425 MS
QTC FREDERICIA: 404 MS
R AXIS: 62 DEGREES
T AXIS: 52 DEGREES
T OFFSET: 404 MS
VENTRICULAR RATE: 82 BPM

## 2025-03-01 ENCOUNTER — HOSPITAL ENCOUNTER (EMERGENCY)
Facility: HOSPITAL | Age: 25
Discharge: HOME | End: 2025-03-01
Payer: COMMERCIAL

## 2025-03-01 ENCOUNTER — APPOINTMENT (OUTPATIENT)
Dept: RADIOLOGY | Facility: HOSPITAL | Age: 25
End: 2025-03-01
Payer: COMMERCIAL

## 2025-03-01 VITALS
WEIGHT: 207 LBS | BODY MASS INDEX: 28.98 KG/M2 | RESPIRATION RATE: 18 BRPM | SYSTOLIC BLOOD PRESSURE: 174 MMHG | HEART RATE: 100 BPM | DIASTOLIC BLOOD PRESSURE: 92 MMHG | OXYGEN SATURATION: 98 % | HEIGHT: 71 IN | TEMPERATURE: 98.2 F
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
## 2025-03-01 PROCEDURE — 99283 EMERGENCY DEPT VISIT LOW MDM: CPT | Mod: 25

## 2025-03-01 PROCEDURE — 90471 IMMUNIZATION ADMIN: CPT

## 2025-03-01 PROCEDURE — 73060 X-RAY EXAM OF HUMERUS: CPT | Mod: LEFT SIDE | Performed by: RADIOLOGY

## 2025-03-01 PROCEDURE — 90715 TDAP VACCINE 7 YRS/> IM: CPT

## 2025-03-01 PROCEDURE — 2500000001 HC RX 250 WO HCPCS SELF ADMINISTERED DRUGS (ALT 637 FOR MEDICARE OP)

## 2025-03-01 PROCEDURE — 73060 X-RAY EXAM OF HUMERUS: CPT | Mod: LT

## 2025-03-01 PROCEDURE — 2500000004 HC RX 250 GENERAL PHARMACY W/ HCPCS (ALT 636 FOR OP/ED)

## 2025-03-01 RX ORDER — NAPROXEN 250 MG/1
500 TABLET ORAL ONCE
Status: COMPLETED | OUTPATIENT
Start: 2025-03-01 | End: 2025-03-01

## 2025-03-01 RX ORDER — BACITRACIN ZINC 500 UNIT/G
1 OINTMENT (GRAM) TOPICAL 2 TIMES DAILY
Qty: 1 G | Refills: 0 | Status: SHIPPED | OUTPATIENT
Start: 2025-03-01 | End: 2025-03-06

## 2025-03-01 RX ORDER — AMOXICILLIN AND CLAVULANATE POTASSIUM 875; 125 MG/1; MG/1
1 TABLET, FILM COATED ORAL ONCE
Status: COMPLETED | OUTPATIENT
Start: 2025-03-01 | End: 2025-03-01

## 2025-03-01 RX ORDER — AMOXICILLIN AND CLAVULANATE POTASSIUM 875; 125 MG/1; MG/1
1 TABLET, FILM COATED ORAL EVERY 12 HOURS
Qty: 14 TABLET | Refills: 0 | Status: SHIPPED | OUTPATIENT
Start: 2025-03-01 | End: 2025-03-08

## 2025-03-01 RX ORDER — NAPROXEN SODIUM 550 MG/1
550 TABLET ORAL
Qty: 14 TABLET | Refills: 0 | Status: SHIPPED | OUTPATIENT
Start: 2025-03-01 | End: 2025-03-08

## 2025-03-01 RX ORDER — LISINOPRIL 10 MG/1
10 TABLET ORAL DAILY
Qty: 30 TABLET | Refills: 0 | Status: SHIPPED | OUTPATIENT
Start: 2025-03-01 | End: 2025-03-31

## 2025-03-01 RX ADMIN — NAPROXEN 500 MG: 250 TABLET ORAL at 15:58

## 2025-03-01 RX ADMIN — AMOXICILLIN AND CLAVULANATE POTASSIUM 1 TABLET: 875; 125 TABLET, FILM COATED ORAL at 15:58

## 2025-03-01 RX ADMIN — TETANUS TOXOID, REDUCED DIPHTHERIA TOXOID AND ACELLULAR PERTUSSIS VACCINE, ADSORBED 0.5 ML: 5; 2.5; 8; 8; 2.5 SUSPENSION INTRAMUSCULAR at 16:23

## 2025-03-01 ASSESSMENT — LIFESTYLE VARIABLES
HAVE PEOPLE ANNOYED YOU BY CRITICIZING YOUR DRINKING: NO
HAVE YOU EVER FELT YOU SHOULD CUT DOWN ON YOUR DRINKING: NO
EVER HAD A DRINK FIRST THING IN THE MORNING TO STEADY YOUR NERVES TO GET RID OF A HANGOVER: NO
TOTAL SCORE: 0
EVER FELT BAD OR GUILTY ABOUT YOUR DRINKING: NO

## 2025-03-01 ASSESSMENT — COLUMBIA-SUICIDE SEVERITY RATING SCALE - C-SSRS
6. HAVE YOU EVER DONE ANYTHING, STARTED TO DO ANYTHING, OR PREPARED TO DO ANYTHING TO END YOUR LIFE?: NO
2. HAVE YOU ACTUALLY HAD ANY THOUGHTS OF KILLING YOURSELF?: NO
1. IN THE PAST MONTH, HAVE YOU WISHED YOU WERE DEAD OR WISHED YOU COULD GO TO SLEEP AND NOT WAKE UP?: NO

## 2025-03-01 ASSESSMENT — PAIN - FUNCTIONAL ASSESSMENT: PAIN_FUNCTIONAL_ASSESSMENT: 0-10

## 2025-03-01 ASSESSMENT — PAIN SCALES - GENERAL: PAINLEVEL_OUTOF10: 5 - MODERATE PAIN

## 2025-03-01 NOTE — ED PROVIDER NOTES
HPI   Chief Complaint   Patient presents with    Animal Bite       History provided by: Patient    Limitations to history: None    CC: Dog bite    HPI: 24-year-old male with a history of hypertension presents the emergency department to be evaluated for a dog bite.  He states that this occurred within an hour prior to arrival.  Patient was with his friend who has a cane Purvi, and states that the do bit him in the left arm over the deltoid.  Patient believes this was unprovoked but nothing like this is ever happened before.  He reports several abrasions and puncture wounds.  He reports that the area is very painful but denies numbness tingling or weakness in the extremity.  He denies foreign body sensation.  He has not taken anything for his discomfort prior to arrival.  Tetanus needs to be updated.  He denies pain or injury elsewhere.  He reports redness and swelling near the injury site.  Denies all other systemic symptoms.  Patient's blood pressure is 205/114, he admits that he has not taken his lisinopril in quite some time and does not have a family provider.    ROS: Negative unless mentioned in HPI    Medical Hx: Denies allergies.     Physical exam:    Constitutional: Patient is well-nourished and well-developed.  Sitting comfortably in the room and in no distress.  Oriented to person, place, time, and situation.    HEENT: Head is normocephalic, atraumatic. Patient's airway is patent.  Tympanic membranes are clear bilaterally.  Nasal mucosa clear.  Mouth with normal mucosa.  Throat is not erythematous and there are no oropharyngeal exudates, uvula is midline.  No obvious facial deformities.    Eyes: Clear bilaterally.  Pupils are equal round and reactive to light and accommodation.  Extraocular movements intact.      Cardiac: Tachycardia, regular rhythm.  Heart sounds S1, S2.  No murmurs, rubs, or gallops.  PMI nondisplaced.  No JVD.    Respiratory: Regular respiratory rate and effort.  Breath sounds are clear  and equal bilaterally, no adventitious lung sounds.  Patient is speaking in full sentences and is in no apparent respiratory distress. No use of accessory muscles.      Gastrointestinal: Abdomen is soft, nondistended, and nontender.  There are no obvious deformities.  No rebound tenderness or guarding.  Bowel sounds are normal active.    Genitourinary: No CVA or flank tenderness.    Musculoskeletal: Patient has several abrasions and small puncture wounds over the left deltoid.  There is some skin redness and swelling but no warmth.  No areas of fluctuance or drainage.  No large open or gaping lacerations.  No foreign bodies.  No active bleeding.  The area is tender to the touch.  Compartment is soft.  No obvious bony deformities.  Patient has equal range of motion in all extremities and no strength deficiencies.   No back or neck tenderness.  Capillary refill less than 3 seconds.  Strong peripheral pulses.  No sensory deficits.    Neurological: Patient is alert and oriented.  No focal deficits.  5/5 strength in all extremities.  Cranial nerves II through XII intact. GCS15.     Skin: Skin is normal color for race and is warm, dry, and intact.     Psych: Appropriate mood and affect.  No apparent risk to self or others.    Heme/lymph: No adenopathy, lymphadenopathy, or splenomegaly    Physical exam is otherwise negative unless stated above or in history of present illness.              Patient History   Past Medical History:   Diagnosis Date    Cellulitis of left upper limb     Cellulitis of left upper extremity    Lymphangitis     Lymphangitis    Personal history of other specified conditions 11/14/2019    History of chest pain    Personal history of other specified conditions 12/06/2019    History of abdominal pain     Past Surgical History:   Procedure Laterality Date    OTHER SURGICAL HISTORY  11/07/2019    Bladder surgery     No family history on file.  Social History     Tobacco Use    Smoking status: Never     Smokeless tobacco: Never   Substance Use Topics    Alcohol use: Not on file    Drug use: Not on file       Physical Exam   ED Triage Vitals [03/01/25 1525]   Temperature Heart Rate Respirations BP   36.8 °C (98.2 °F) (!) 123 18 (!) 205/114      Pulse Ox Temp src Heart Rate Source Patient Position   97 % -- -- --      BP Location FiO2 (%)     -- --       Physical Exam      ED Course & MDM   Diagnoses as of 03/01/25 1620   Dog bite, initial encounter     Patient updated on plan for lab testing, IV insertion, radiology imaging, and medications to be administered while in the ER (if indicated). Patient updated on expected wait times for testing and results. Patient provided my name and told to ask any staff member for questions or concerns if they should arise. Electronic medical record reviewed.     MDM    Upon initial assessment, patient was healthy non-toxic appearing and in no apparent distress.     Patient presented to the emergency department with the chief complaint arm injury.   Patient has several abrasions and small puncture wounds over the left deltoid.  There is some skin redness and swelling but no warmth.  No areas of fluctuance or drainage.  No large open or gaping lacerations.  No foreign bodies.  No active bleeding.  The area is tender to the touch.  Compartment is soft.  No obvious bony deformities.  Patient has equal range of motion in all extremities and no strength deficiencies.   No back or neck tenderness.  Capillary refill less than 3 seconds.  Strong peripheral pulses.  No sensory deficits. On arrival to the emergency department, vital signs were  significant for hypertension and tachycardia, likely due to his recent injury.    Patient's wound will be cleaned very well by the nursing staff and my student.  X-ray will be done to evaluate for a bony injury.  Patient does not have any wounds that require suturing.   patient's arm will be wrapped in dressings and an Ace wrap.  He will be given  Anaprox for his discomfort and started on Augmentin.  Patient has no findings that would suggest an obvious retained foreign body, arterial injury, tendon injury, nerve injury.    Patient's tetanus was updated.    X-ray did not reveal any traumatic injury to the bone or retained foreign body.  Patient is feeling better.  His heart rate has improved but he is still mildly hypertensive, he admits that he has not taken his lisinopril in months and that he does not have a primary care provider.  I will start the patient on 10 mg daily lisinopril and I will have registration set him up with an appointment to follow-up with the PCP.  He will also be given bacitracin, Anaprox, and Augmentin.  I discussed supportive treatment including keeping the area clean and signs of infection.  All questions and concerns addressed.  Reasons to return to ER discussed.  Patient verbalized understanding and agreement with the treatment plan and they remained hemodynamically stable in the ER.        This note was dictated using a speech recognition program.  While an attempt was made at proof-reading to minimize errors, minor errors in transcription may be present            No data recorded     [place] Coma Scale Score: 15 (03/01/25 1529 : Maude Franklin RN)                           Medical Decision Making      Procedure  Procedures     Ebenezer Crum PA-C  03/01/25 7014
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
## 2025-03-17 RX ORDER — LISINOPRIL 10 MG/1
10 TABLET ORAL DAILY
Qty: 30 TABLET | Refills: 0 | OUTPATIENT
Start: 2025-03-17 | End: 2025-04-16

## 2025-03-17 NOTE — TELEPHONE ENCOUNTER
Rx Refill Request Telephone Encounter    Name:  Talon Lo  :  [id number]  Medication Name:  lisinopril 10 mg tablet             Specific Pharmacy location:    [facility] [address] [place] [facility] - [place], [place] - [address]  [address] [place] [address]  Phone: [phone]  Fax: [phone]  MISHEL #: [id number]       Best number to reach patient:  [phone]

## 2025-04-03 ENCOUNTER — APPOINTMENT (OUTPATIENT)
Dept: PRIMARY CARE | Facility: CLINIC | Age: 25
End: 2025-04-03
Payer: COMMERCIAL

## 2025-04-03 VITALS
SYSTOLIC BLOOD PRESSURE: 143 MMHG | BODY MASS INDEX: 28.73 KG/M2 | WEIGHT: 206 LBS | DIASTOLIC BLOOD PRESSURE: 83 MMHG | OXYGEN SATURATION: 97 % | TEMPERATURE: 97.3 F | HEART RATE: 76 BPM

## 2025-04-03 DIAGNOSIS — I10 HYPERTENSION, UNSPECIFIED TYPE: Primary | ICD-10-CM

## 2025-04-03 PROCEDURE — 1036F TOBACCO NON-USER: CPT | Performed by: FAMILY MEDICINE

## 2025-04-03 PROCEDURE — 99214 OFFICE O/P EST MOD 30 MIN: CPT | Performed by: FAMILY MEDICINE

## 2025-04-03 PROCEDURE — 3077F SYST BP >= 140 MM HG: CPT | Performed by: FAMILY MEDICINE

## 2025-04-03 PROCEDURE — 3079F DIAST BP 80-89 MM HG: CPT | Performed by: FAMILY MEDICINE

## 2025-04-03 RX ORDER — LISINOPRIL 20 MG/1
20 TABLET ORAL DAILY
Qty: 30 TABLET | Refills: 5 | Status: SHIPPED | OUTPATIENT
Start: 2025-04-03

## 2025-04-03 ASSESSMENT — ENCOUNTER SYMPTOMS
COUGH: 0
FEVER: 0
HEADACHES: 0
DIZZINESS: 0
SHORTNESS OF BREATH: 0
FATIGUE: 1

## 2025-04-03 ASSESSMENT — PATIENT HEALTH QUESTIONNAIRE - PHQ9
1. LITTLE INTEREST OR PLEASURE IN DOING THINGS: NOT AT ALL
SUM OF ALL RESPONSES TO PHQ9 QUESTIONS 1 & 2: 0
2. FEELING DOWN, DEPRESSED OR HOPELESS: NOT AT ALL

## 2025-04-03 NOTE — PROGRESS NOTES
Subjective   Patient ID: Talon Lo is a 24 y.o. male who presents for Med Refill.    HPI     He is here today to reestablish care and follow-up on hypertension.  He was last seen in our office in 2021  He has a history of hypertension and is currently taking lisinopril 10 mg daily  He had been seen in the ED approximately 1 month ago with a dog bite.  At that time his blood pressure was as high as 205/114.  He was not taking any antihypertensive medications at that time.  He was started on lisinopril 10 mg daily at that time  He has been compliant with taking.  Does not check blood pressure out of the office.  He is feeling well denies any headache, dizziness, chest pain or palpitations  He has been trying to get more active for exercise and does not get much salt in his diet.  Several years ago he was previously on lisinopril 20 mg daily which worked well to control his blood pressure  No other concerns today    He had also been seen in the ED on 2/20 with flulike symptoms and I reviewed his labs from that visit  CMP: Potassium mildly low at 3.3  CBC: Unremarkable  Troponin: Negative    Patient Health Questionnaire-2 Score: 0 (4/3/2025  3:00 PM)        Review of Systems   Constitutional:  Positive for fatigue. Negative for fever.   Respiratory:  Negative for cough and shortness of breath.    Cardiovascular:  Negative for chest pain.   Neurological:  Negative for dizziness and headaches.       Objective   /83   Pulse 76   Temp 36.3 °C (97.3 °F) (Temporal)   Wt 93.4 kg (206 lb)   SpO2 97%   BMI 28.73 kg/m²     Physical Exam  Vitals reviewed.   Constitutional:       General: He is not in acute distress.     Appearance: Normal appearance. He is well-developed.   HENT:      Head: Normocephalic.   Eyes:      Conjunctiva/sclera: Conjunctivae normal.   Cardiovascular:      Rate and Rhythm: Normal rate and regular rhythm.      Heart sounds: Normal heart sounds.   Pulmonary:      Effort: Pulmonary effort  is normal.      Breath sounds: Normal breath sounds.   Musculoskeletal:      Right lower leg: No edema.      Left lower leg: No edema.   Skin:     Findings: No rash.   Neurological:      Mental Status: He is alert.   Psychiatric:         Mood and Affect: Mood normal.         Behavior: Behavior normal.         Assessment/Plan   Assessment & Plan  Dog bite, initial encounter         Hypertension, unspecified type    Orders:    Referral to Primary Care    Basic Metabolic Panel; Future    TSH with reflex to Free T4 if abnormal; Future    Lipid Panel; Future    lisinopril 20 mg tablet; Take 1 tablet (20 mg) by mouth once daily.    Hypertension: His blood pressure has improved since he was seen in the ED last month, but is still elevated at 143/83.  We will increase his lisinopril from 10 to 20 mg daily.  Check labs including TSH and lipid panel.  Will also recheck his potassium level (this was mildly decreased at 3.3 when seen in the ED recently).  Discussed regular aerobic exercise and low-sodium diet and we will have him follow-up in 1 to 2 months for blood pressure recheck

## 2025-04-03 NOTE — ASSESSMENT & PLAN NOTE
Orders:    Referral to Primary Care    Basic Metabolic Panel; Future    TSH with reflex to Free T4 if abnormal; Future    Lipid Panel; Future    lisinopril 20 mg tablet; Take 1 tablet (20 mg) by mouth once daily.

## 2025-04-22 LAB
ANION GAP SERPL CALCULATED.4IONS-SCNC: 9 MMOL/L (CALC) (ref 7–17)
BUN SERPL-MCNC: 9 MG/DL (ref 7–25)
BUN/CREAT SERPL: NORMAL (CALC) (ref 6–22)
CALCIUM SERPL-MCNC: 10 MG/DL (ref 8.6–10.3)
CHLORIDE SERPL-SCNC: 99 MMOL/L (ref 98–110)
CHOLEST SERPL-MCNC: 181 MG/DL
CHOLEST/HDLC SERPL: 3.7 (CALC)
CO2 SERPL-SCNC: 29 MMOL/L (ref 20–32)
CREAT SERPL-MCNC: 0.91 MG/DL (ref 0.6–1.24)
EGFRCR SERPLBLD CKD-EPI 2021: 119 ML/MIN/1.73M2
GLUCOSE SERPL-MCNC: 76 MG/DL (ref 65–99)
HDLC SERPL-MCNC: 49 MG/DL
LDLC SERPL CALC-MCNC: 112 MG/DL (CALC)
NONHDLC SERPL-MCNC: 132 MG/DL (CALC)
POTASSIUM SERPL-SCNC: 4.4 MMOL/L (ref 3.5–5.3)
SODIUM SERPL-SCNC: 137 MMOL/L (ref 135–146)
TRIGL SERPL-MCNC: 96 MG/DL
TSH SERPL-ACNC: 0.8 MIU/L (ref 0.4–4.5)

## 2025-05-06 ENCOUNTER — APPOINTMENT (OUTPATIENT)
Dept: PRIMARY CARE | Facility: CLINIC | Age: 25
End: 2025-05-06
Payer: COMMERCIAL

## 2025-05-06 ENCOUNTER — TELEPHONE (OUTPATIENT)
Dept: PRIMARY CARE | Facility: CLINIC | Age: 25
End: 2025-05-06

## 2025-05-06 VITALS
HEART RATE: 70 BPM | BODY MASS INDEX: 28.59 KG/M2 | SYSTOLIC BLOOD PRESSURE: 139 MMHG | OXYGEN SATURATION: 98 % | DIASTOLIC BLOOD PRESSURE: 84 MMHG | WEIGHT: 205 LBS | TEMPERATURE: 97 F

## 2025-05-06 DIAGNOSIS — Z00.00 ENCOUNTER FOR WELL ADULT EXAM WITHOUT ABNORMAL FINDINGS: Primary | ICD-10-CM

## 2025-05-06 DIAGNOSIS — I10 HYPERTENSION, UNSPECIFIED TYPE: ICD-10-CM

## 2025-05-06 PROCEDURE — 1036F TOBACCO NON-USER: CPT | Performed by: FAMILY MEDICINE

## 2025-05-06 PROCEDURE — 3079F DIAST BP 80-89 MM HG: CPT | Performed by: FAMILY MEDICINE

## 2025-05-06 PROCEDURE — 3075F SYST BP GE 130 - 139MM HG: CPT | Performed by: FAMILY MEDICINE

## 2025-05-06 PROCEDURE — 99395 PREV VISIT EST AGE 18-39: CPT | Performed by: FAMILY MEDICINE

## 2025-05-06 RX ORDER — LISINOPRIL 20 MG/1
20 TABLET ORAL DAILY
Qty: 90 TABLET | Refills: 2 | Status: SHIPPED | OUTPATIENT
Start: 2025-05-06

## 2025-05-06 ASSESSMENT — ENCOUNTER SYMPTOMS
HEADACHES: 0
DIZZINESS: 0
COUGH: 0
ABDOMINAL PAIN: 0
FEVER: 0

## 2025-05-06 NOTE — LETTER
May 6, 2025     Patient: Talon Lo   YOB: 2000   Date of Visit: 5/6/2025       To Whom It May Concern:    Please be advised that during lab draws Talon Lo is prone to passing out, especially when fishing for a vein.      If you have any questions or concerns, please don't hesitate to call.         Sincerely,         Asael Avelar, DO

## 2025-05-06 NOTE — PROGRESS NOTES
Subjective   Patient ID: Talon Lo is a 24 y.o. male who presents for Hypertension, Other (Pt also wanted to note when blood is drawn he faint./At last blood draw pt fainted zia. ), and Annual Exam.    HPI       He is here today for follow-up on hypertension.  He is also due for his annual physical  He has a history of hypertension currently taking lisinopril 20 mg daily.  We increased his dose of lisinopril from 10 mg daily at last visit 1 month ago.  Blood pressure at that time was 143/83  Compliant with taking and denies any adverse effects including any lightheadedness.  He does not check his blood pressure out of the office  He has been doing well with exercise.  He walks daily and has had 12,000 steps today.  He did recently have labs checked last month and passed out when he had his labs done.  This has happened to him in the past when he has had labs done and he would like a note stating that can happen when he has labs drawn  He follows with optometry regularly for visual acuity exams  Brushes teeth but he is due for a dental exam  No family history of any early cancer or early heart disease  He is sleeping well  Received Tdap vaccine in 2025        Review of Systems   Constitutional:  Negative for fever.   Respiratory:  Negative for cough.    Cardiovascular:  Negative for chest pain and leg swelling.   Gastrointestinal:  Negative for abdominal pain.   Neurological:  Negative for dizziness and headaches.   All other systems reviewed and are negative.      Objective   /84   Pulse 70   Temp 36.1 °C (97 °F) (Temporal)   Wt 93 kg (205 lb)   SpO2 98%   BMI 28.59 kg/m²     Physical Exam  Vitals reviewed.   Constitutional:       General: He is not in acute distress.     Appearance: Normal appearance. He is well-developed.   HENT:      Head: Normocephalic.      Right Ear: Tympanic membrane, ear canal and external ear normal.      Left Ear: Tympanic membrane, ear canal and external ear normal.       Nose: Nose normal.      Mouth/Throat:      Mouth: Mucous membranes are moist.   Eyes:      Conjunctiva/sclera: Conjunctivae normal.   Neck:      Thyroid: No thyromegaly.      Vascular: No JVD.   Cardiovascular:      Rate and Rhythm: Normal rate and regular rhythm.      Heart sounds: Normal heart sounds.   Pulmonary:      Effort: Pulmonary effort is normal.      Breath sounds: Normal breath sounds.   Abdominal:      Palpations: Abdomen is soft.      Tenderness: There is no abdominal tenderness.   Musculoskeletal:      Right lower leg: No edema.      Left lower leg: No edema.   Lymphadenopathy:      Cervical: No cervical adenopathy.   Skin:     Findings: No rash.   Neurological:      Mental Status: He is alert and oriented to person, place, and time.   Psychiatric:         Mood and Affect: Mood normal.         Behavior: Behavior normal.         Assessment/Plan   Assessment & Plan  Encounter for well adult exam without abnormal findings         Hypertension, unspecified type    Orders:    lisinopril 20 mg tablet; Take 1 tablet (20 mg) by mouth once daily.    Preventative health:  Continue to work on healthy diet and regular exercise  We reviewed his labs from 4/21/2025.  LDL slightly suboptimal at 112, and we discussed avoiding saturated fats in diet.  Will plan on rechecking this again in 1 year.  Otherwise labs including TSH and BMP were unremarkable  He is up-to-date on tetanus vaccination  Hypertension: Blood pressure has improved to 139/84.  We will continue lisinopril at 20 mg daily.  I did recommend that he start to monitor and record his blood pressure out of the office and to call me if he starts to get any readings greater than 140 systolic.  Follow-up in 6 months to recheck blood pressure  Regarding his symptoms of getting lightheaded and passing out with lab draws, we will give him a note for him to keep with him the next time he has a lab draw done  Follow-up in 6 months for recheck

## 2025-08-03 DIAGNOSIS — I10 HYPERTENSION, UNSPECIFIED TYPE: ICD-10-CM

## 2025-08-04 RX ORDER — LISINOPRIL 20 MG/1
20 TABLET ORAL DAILY
Qty: 91 TABLET | Refills: 1 | Status: SHIPPED | OUTPATIENT
Start: 2025-08-04

## 2025-08-06 ENCOUNTER — TELEPHONE (OUTPATIENT)
Dept: PRIMARY CARE | Facility: CLINIC | Age: 25
End: 2025-08-06
Payer: COMMERCIAL

## 2025-08-06 NOTE — TELEPHONE ENCOUNTER
Patient states he is bleeding from his rectum after bowel movements. He states its light pink to red    He is wondering if he can be seen in the next few days.     He would prefer to be seen in [facility] as opposed to [facility]      Please Advise    [phone]

## 2025-08-25 ENCOUNTER — APPOINTMENT (OUTPATIENT)
Dept: PRIMARY CARE | Facility: CLINIC | Age: 25
End: 2025-08-25
Payer: COMMERCIAL

## 2025-08-25 VITALS
SYSTOLIC BLOOD PRESSURE: 136 MMHG | OXYGEN SATURATION: 98 % | BODY MASS INDEX: 27.89 KG/M2 | WEIGHT: 200 LBS | DIASTOLIC BLOOD PRESSURE: 80 MMHG | TEMPERATURE: 97.6 F | HEART RATE: 104 BPM

## 2025-08-25 DIAGNOSIS — K62.5 RECTAL BLEEDING: Primary | ICD-10-CM

## 2025-08-25 PROCEDURE — 99213 OFFICE O/P EST LOW 20 MIN: CPT | Performed by: FAMILY MEDICINE

## 2025-08-25 PROCEDURE — 3078F DIAST BP <80 MM HG: CPT | Performed by: FAMILY MEDICINE

## 2025-08-25 PROCEDURE — 1036F TOBACCO NON-USER: CPT | Performed by: FAMILY MEDICINE

## 2025-08-25 PROCEDURE — 3075F SYST BP GE 130 - 139MM HG: CPT | Performed by: FAMILY MEDICINE

## 2025-08-25 ASSESSMENT — ENCOUNTER SYMPTOMS
COUGH: 0
DIZZINESS: 0
FEVER: 0
ABDOMINAL PAIN: 0
FATIGUE: 0

## 2025-09-18 ENCOUNTER — APPOINTMENT (OUTPATIENT)
Dept: GASTROENTEROLOGY | Facility: CLINIC | Age: 25
End: 2025-09-18
Payer: COMMERCIAL

## 2025-11-06 ENCOUNTER — APPOINTMENT (OUTPATIENT)
Dept: PRIMARY CARE | Facility: CLINIC | Age: 25
End: 2025-11-06
Payer: COMMERCIAL